# Patient Record
Sex: FEMALE | Race: WHITE | URBAN - METROPOLITAN AREA
[De-identification: names, ages, dates, MRNs, and addresses within clinical notes are randomized per-mention and may not be internally consistent; named-entity substitution may affect disease eponyms.]

---

## 2021-09-13 ENCOUNTER — OFFICE VISIT (OUTPATIENT)
Dept: URBAN - METROPOLITAN AREA CLINIC 76 | Facility: CLINIC | Age: 46
End: 2021-09-13
Payer: COMMERCIAL

## 2021-09-13 DIAGNOSIS — T15.01XA FOREIGN BODY IN CORNEA, RIGHT EYE, INITIAL ENCOUNTER: Primary | ICD-10-CM

## 2021-09-13 PROCEDURE — 99204 OFFICE O/P NEW MOD 45 MIN: CPT | Performed by: OPTOMETRIST

## 2021-09-13 PROCEDURE — 92071 CONTACT LENS FITTING FOR TX: CPT | Performed by: OPTOMETRIST

## 2021-09-13 RX ORDER — OFLOXACIN 3 MG/ML
0.3 % SOLUTION/ DROPS OPHTHALMIC
Qty: 5 | Refills: 0 | Status: ACTIVE
Start: 2021-09-13

## 2021-09-13 RX ORDER — NEOMYCIN SULFATE, POLYMYXIN B SULFATE AND DEXAMETHASONE 3.5; 10000; 1 MG/G; [USP'U]/G; MG/G
OINTMENT OPHTHALMIC
Qty: 1 | Refills: 1 | Status: ACTIVE
Start: 2021-09-13

## 2021-09-13 ASSESSMENT — INTRAOCULAR PRESSURE: OS: 16

## 2021-09-13 NOTE — IMPRESSION/PLAN
Impression: Foreign body in cornea, right eye, initial encounter: T15.01xA. -Loctite glue, 2/3rd of eye glued shut with glue in eye
-Obtained verbal and written consent to remove glue from eye/lids/lashes.
-Corneal abrasion
-Extensive time spent removing glue from eye, glue removed from lashes and lid to be able to open eye, small amount of glue left on lashes until f/up tomorrow to allow time for Maxitrol constantino to loosen. Plan: Discussed. Pt denies allergies to medications. Used Maxitrol constantino w/ cotton tip and sterile forceps and scissors to loosen the material. Instilled Lidocaine jelly in office. Pt tolerated procedure well. BCL placed today, Rx'd Maxitrol constantino TID OD to out lids and Ofloxacin QID OD.

## 2021-09-14 ENCOUNTER — OFFICE VISIT (OUTPATIENT)
Dept: URBAN - METROPOLITAN AREA CLINIC 81 | Facility: CLINIC | Age: 46
End: 2021-09-14
Payer: COMMERCIAL

## 2021-09-14 DIAGNOSIS — T15.01XS FOREIGN BODY IN CORNEA, RIGHT EYE, SEQUELA: Primary | ICD-10-CM

## 2021-09-14 PROCEDURE — 99213 OFFICE O/P EST LOW 20 MIN: CPT | Performed by: OPTOMETRIST

## 2021-09-14 NOTE — IMPRESSION/PLAN
Impression: Foreign body in cornea, right eye, sequela: T15.01XS. -Loctite super glue
-cornea abrasion 95% resolved, significant improvement in vision OD Plan: Continue Maxitrol BID on outer eyelid as directed, and Ofloxacin QID OD until next appointment. Discussed option of cutting off lashes with residual super glue. Patient elects to wait until next f/up and trial warm compresses and Maxitrol constantino. Warm compresses as directed to break down super glue. Removed BCL OD today.